# Patient Record
Sex: MALE | Race: WHITE
[De-identification: names, ages, dates, MRNs, and addresses within clinical notes are randomized per-mention and may not be internally consistent; named-entity substitution may affect disease eponyms.]

---

## 2023-10-12 ENCOUNTER — HOSPITAL ENCOUNTER (OUTPATIENT)
Dept: HOSPITAL 95 - ORSCSDS | Age: 83
Discharge: HOME | End: 2023-10-12
Attending: OPHTHALMOLOGY
Payer: OTHER GOVERNMENT

## 2023-10-12 VITALS — WEIGHT: 172.62 LBS | BODY MASS INDEX: 25.57 KG/M2 | HEIGHT: 69 IN

## 2023-10-12 DIAGNOSIS — E11.36: Primary | ICD-10-CM

## 2023-10-12 DIAGNOSIS — E11.3292: ICD-10-CM

## 2023-10-12 DIAGNOSIS — F41.9: ICD-10-CM

## 2023-10-12 DIAGNOSIS — Z79.899: ICD-10-CM

## 2023-10-12 DIAGNOSIS — Z79.84: ICD-10-CM

## 2023-10-12 DIAGNOSIS — H25.13: ICD-10-CM

## 2023-10-12 PROCEDURE — V2632 POST CHMBR INTRAOCULAR LENS: HCPCS

## 2023-10-12 PROCEDURE — 08RJ3JZ REPLACEMENT OF RIGHT LENS WITH SYNTHETIC SUBSTITUTE, PERCUTANEOUS APPROACH: ICD-10-PCS | Performed by: OPHTHALMOLOGY

## 2023-10-19 ENCOUNTER — HOSPITAL ENCOUNTER (OUTPATIENT)
Dept: HOSPITAL 95 - ORSCSDS | Age: 83
Discharge: HOME | End: 2023-10-19
Attending: OPHTHALMOLOGY
Payer: OTHER GOVERNMENT

## 2023-10-19 VITALS — WEIGHT: 169.98 LBS | BODY MASS INDEX: 25.18 KG/M2 | HEIGHT: 69 IN

## 2023-10-19 DIAGNOSIS — Z96.1: ICD-10-CM

## 2023-10-19 DIAGNOSIS — F41.9: ICD-10-CM

## 2023-10-19 DIAGNOSIS — Z79.899: ICD-10-CM

## 2023-10-19 DIAGNOSIS — H25.12: ICD-10-CM

## 2023-10-19 DIAGNOSIS — E11.36: Primary | ICD-10-CM

## 2023-10-19 PROCEDURE — 08RK3JZ REPLACEMENT OF LEFT LENS WITH SYNTHETIC SUBSTITUTE, PERCUTANEOUS APPROACH: ICD-10-PCS | Performed by: OPHTHALMOLOGY

## 2023-10-19 PROCEDURE — V2632 POST CHMBR INTRAOCULAR LENS: HCPCS

## 2023-10-19 NOTE — NUR
10/19/23 1141 Sydnie Marcelo
1 DROP OF TETRACAINE ADMINISTERED TO THE L EYE AT 1135, PLEDGET PLACED
IN L EYE AT 1136 BY [facility].RDS